# Patient Record
Sex: FEMALE | Race: BLACK OR AFRICAN AMERICAN
[De-identification: names, ages, dates, MRNs, and addresses within clinical notes are randomized per-mention and may not be internally consistent; named-entity substitution may affect disease eponyms.]

---

## 2017-09-28 ENCOUNTER — HOSPITAL ENCOUNTER (OUTPATIENT)
Dept: HOSPITAL 17 - CLAB | Age: 62
End: 2017-09-28
Payer: COMMERCIAL

## 2017-09-28 DIAGNOSIS — R30.0: Primary | ICD-10-CM

## 2017-09-28 LAB
COLOR UR: (no result)
COMMENT (UR): (no result)
CULTURE IF INDICATED: (no result)
GLUCOSE UR STRIP-MCNC: (no result) MG/DL
HGB UR QL STRIP: (no result)
HYALINE CASTS #/AREA URNS LPF: 1 /LPF
KETONES UR STRIP-MCNC: (no result) MG/DL
NITRITE UR QL STRIP: (no result)
SP GR UR STRIP: 1.01 (ref 1–1.03)
SQUAMOUS #/AREA URNS HPF: <1 /HPF (ref 0–5)

## 2017-09-28 PROCEDURE — 81001 URINALYSIS AUTO W/SCOPE: CPT

## 2017-12-21 ENCOUNTER — HOSPITAL ENCOUNTER (OUTPATIENT)
Dept: HOSPITAL 17 - CLAB | Age: 62
End: 2017-12-21
Payer: COMMERCIAL

## 2017-12-21 DIAGNOSIS — Z11.59: ICD-10-CM

## 2017-12-21 DIAGNOSIS — I10: ICD-10-CM

## 2017-12-21 DIAGNOSIS — E55.9: ICD-10-CM

## 2017-12-21 DIAGNOSIS — E78.00: Primary | ICD-10-CM

## 2017-12-21 LAB
ALBUMIN SERPL-MCNC: 3.9 GM/DL (ref 3.4–5)
ALP SERPL-CCNC: 133 U/L (ref 45–117)
ALT SERPL-CCNC: 25 U/L (ref 10–53)
AST SERPL-CCNC: 15 U/L (ref 15–37)
BILIRUB SERPL-MCNC: 0.6 MG/DL (ref 0.2–1)
BUN SERPL-MCNC: 15 MG/DL (ref 7–18)
CALCIUM SERPL-MCNC: 9.5 MG/DL (ref 8.5–10.1)
CHLORIDE SERPL-SCNC: 104 MEQ/L (ref 98–107)
CHOLEST SERPL-MCNC: 202 MG/DL (ref 120–200)
CHOLESTEROL/ HDL RATIO: 2.66 RATIO
CREAT SERPL-MCNC: 0.83 MG/DL (ref 0.5–1)
GFR SERPLBLD BASED ON 1.73 SQ M-ARVRAT: 84 ML/MIN (ref 89–?)
HCO3 BLD-SCNC: 30.1 MEQ/L (ref 21–32)
HDLC SERPL-MCNC: 75.8 MG/DL (ref 40–60)
LDLC SERPL-MCNC: 113 MG/DL (ref 0–99)
PROT SERPL-MCNC: 7.7 GM/DL (ref 6.4–8.2)
SODIUM SERPL-SCNC: 142 MEQ/L (ref 136–145)
TRIGL SERPL-MCNC: 68 MG/DL (ref 42–150)

## 2017-12-21 PROCEDURE — 80061 LIPID PANEL: CPT

## 2017-12-21 PROCEDURE — 82306 VITAMIN D 25 HYDROXY: CPT

## 2017-12-21 PROCEDURE — 36415 COLL VENOUS BLD VENIPUNCTURE: CPT

## 2017-12-21 PROCEDURE — 80053 COMPREHEN METABOLIC PANEL: CPT

## 2017-12-21 PROCEDURE — 86803 HEPATITIS C AB TEST: CPT

## 2018-06-13 ENCOUNTER — HOSPITAL ENCOUNTER (OUTPATIENT)
Dept: HOSPITAL 17 - HSDC | Age: 63
End: 2018-06-13
Attending: COLON & RECTAL SURGERY
Payer: COMMERCIAL

## 2018-06-13 VITALS — HEIGHT: 63 IN | BODY MASS INDEX: 32.15 KG/M2 | WEIGHT: 181.44 LBS

## 2018-06-13 VITALS
RESPIRATION RATE: 18 BRPM | DIASTOLIC BLOOD PRESSURE: 88 MMHG | HEART RATE: 80 BPM | TEMPERATURE: 97.5 F | SYSTOLIC BLOOD PRESSURE: 142 MMHG | OXYGEN SATURATION: 97 %

## 2018-06-13 DIAGNOSIS — Z86.010: ICD-10-CM

## 2018-06-13 DIAGNOSIS — K50.00: ICD-10-CM

## 2018-06-13 DIAGNOSIS — Z12.11: Primary | ICD-10-CM

## 2018-06-13 DIAGNOSIS — R94.31: ICD-10-CM

## 2018-06-13 DIAGNOSIS — K63.5: ICD-10-CM

## 2018-06-13 PROCEDURE — 00812 ANES LWR INTST SCR COLSC: CPT

## 2018-06-13 PROCEDURE — 88305 TISSUE EXAM BY PATHOLOGIST: CPT

## 2018-06-13 PROCEDURE — 45380 COLONOSCOPY AND BIOPSY: CPT

## 2018-06-13 PROCEDURE — 45388 COLONOSCOPY W/ABLATION: CPT

## 2018-06-13 PROCEDURE — 93005 ELECTROCARDIOGRAM TRACING: CPT

## 2018-06-13 NOTE — EKG
Date Performed: 06/13/2018       Time Performed: 06:58:03

 

PTAGE:      62 years

 

EKG:      Sinus rhythm 

 

 WITH SINUS ARRHYTHMIA MODERATE VOLTAGE CRITERIA FOR LVH, CONSIDER NORMAL VARIANT NONSPECIFIC T-WAVE 
ABNORMALITY BORDERLINE ECG

 

PREVIOUS TRACING       : 12/13/2016 22.02

 

DOCTOR:   Kodi Raza  Interpretating Date/Time  06/13/2018 08:16:49

## 2018-06-13 NOTE — GIPROC
[facility]

[address] [facility]. [facility], [address]

 

 

COLONOSCOPY PROCEDURE REPORT     EXAM DATE: 06/13/2018

 

PATIENT NAME:      Loren Jalloh           MR #:      [id number]

YOB: 1955      VISIT #:     [id number]

ENDOSCOPIST:     Joann Beckman MD     ORDER #:     [id number]

ASSISTANT:      Rosana Garcia and Vale Fraser     STATUS:     outpatient

 

 

INDICATIONS:  The patient is a 62 yr old female here for a colonoscopy due to

Screening, personal history of polyps.

PROCEDURE PERFORMED:     Total Colonoscopy with polypectomy with biopsy forceps,

biopsy of TI, and fulguration of polyps.

MEDICATIONS:     See Anesthesia Record

ESTIMATED BLOOD LOSS:     None

 

CONSENT: The patient understands the risks and benefits of the procedure and

understands that these risks include, but are not limited to: sedation,

allergic reaction, infection, perforation and/or bleeding. Alternative means of

evaluation and treatment include, among others: physical exam, x-rays, and/or

surgical intervention. The patient elects to proceed with this endoscopic

procedure.

 

DESCRIPTION OF PROCEDURE:



checked for proper function. Hand hygiene and appropriate measures for

infection prevention was taken. After the risks, benefits and alternatives of

the procedure were thoroughly explained, Informed consent was verified,

confirmed and timeout was successfully executed by the treatment team. A

digital exam was performed.  The    endoscope was introduced through the anus

and advanced to the ileocolic anastomosis.   The prep quality was excellent.

The instrument was then slowly withdrawn as the colon was fully examined.

 

Multiple tiny polypoid areas consistent with hyperplasia were noted in the

terminal ileum.  Biopsies were obtained   There were scattered 1-2 mm polyps

noted within the transverse colon, descending colon, and sigmoid.

Polypectomies were performed in the sigmoid.  The remainder were fulgurated.

There were no abnormalities in the rectum.  The scope was then completely

withdrawn from the patient and the procedure terminated.

 

 

 

ADVERSE EVENTS:      There were no complications.

WITHDRAWL TIME:      10 minutes

DEGREE OF DIFFICULTY:

IMPRESSIONS:     Normal Colon

 

RECOMMENDATIONS:     Await biopsies

PATIENT CONDITION:     Stable

DISPOSITION:     Home

RECALL:     If polyps adenomatous, repeat in 3 years; if hyperplastic, repeat in

5 years

 

_____________________________

Joann Beckman MD

eSigned:  Joann Beckman MD 06/13/2018 8:21 AM

 

 

cc:  Dr. Rivera

 

 

 

 

PATIENT NAME:  Loren Jalloh

MR#: [id number]